# Patient Record
Sex: MALE | Race: WHITE | NOT HISPANIC OR LATINO | Employment: OTHER | URBAN - METROPOLITAN AREA
[De-identification: names, ages, dates, MRNs, and addresses within clinical notes are randomized per-mention and may not be internally consistent; named-entity substitution may affect disease eponyms.]

---

## 2017-09-12 ENCOUNTER — OFFICE VISIT (OUTPATIENT)
Dept: PODIATRY | Facility: CLINIC | Age: 42
End: 2017-09-12
Payer: MEDICARE

## 2017-09-12 VITALS
HEART RATE: 101 BPM | SYSTOLIC BLOOD PRESSURE: 123 MMHG | WEIGHT: 212 LBS | BODY MASS INDEX: 26.36 KG/M2 | DIASTOLIC BLOOD PRESSURE: 85 MMHG | RESPIRATION RATE: 18 BRPM | HEIGHT: 75 IN

## 2017-09-12 DIAGNOSIS — B35.1 ONYCHOMYCOSIS DUE TO DERMATOPHYTE: Primary | ICD-10-CM

## 2017-09-12 PROCEDURE — 3008F BODY MASS INDEX DOCD: CPT | Mod: S$GLB,,, | Performed by: PODIATRIST

## 2017-09-12 PROCEDURE — 99203 OFFICE O/P NEW LOW 30 MIN: CPT | Mod: S$GLB,,, | Performed by: PODIATRIST

## 2017-09-12 RX ORDER — CICLOPIROX 80 MG/ML
SOLUTION TOPICAL NIGHTLY
Qty: 6.6 ML | Refills: 3 | Status: SHIPPED | OUTPATIENT
Start: 2017-09-12 | End: 2017-12-11 | Stop reason: SDUPTHER

## 2017-09-12 RX ORDER — FELBAMATE 400 MG/1
400 TABLET ORAL EVERY 6 HOURS
COMMUNITY

## 2017-09-12 RX ORDER — PHENOBARBITAL 32.4 MG/1
32.4 TABLET ORAL 2 TIMES DAILY
COMMUNITY

## 2017-09-12 RX ORDER — CARBAMAZEPINE 200 MG/1
200 TABLET ORAL 3 TIMES DAILY
COMMUNITY

## 2017-09-12 RX ORDER — RUFINAMIDE 400 MG/1
400 TABLET, FILM COATED ORAL 2 TIMES DAILY WITH MEALS
COMMUNITY

## 2017-09-12 RX ORDER — MULTIVIT WITH MINERALS/HERBS
1 TABLET ORAL DAILY
COMMUNITY

## 2017-09-12 NOTE — PROGRESS NOTES
Subjective:    Patient ID: Martín Astorga is a 42 y.o. male.    Chief Complaint: Nail Problem      HPI:   Martín is a 42 y.o. male who presents to the clinic complaining of thick and discolored toenails on both feet. Martín is inquiring about treatment options.    HPI    Last Podiatry Enc: Visit date not found  Last Enc w/ Me: Visit date not found    No past medical history on file.  No past surgical history on file.  Social History     Social History    Marital status: Single     Spouse name: N/A    Number of children: N/A    Years of education: N/A     Occupational History    Not on file.     Social History Main Topics    Smoking status: Never Smoker    Smokeless tobacco: Not on file    Alcohol use Not on file    Drug use: Unknown    Sexual activity: Not on file     Other Topics Concern    Not on file     Social History Narrative    No narrative on file         Current Outpatient Prescriptions:     b complex vitamins tablet, Take 1 tablet by mouth once daily., Disp: , Rfl:     carbamazepine (TEGRETOL) 200 mg tablet, Take 200 mg by mouth 3 (three) times daily., Disp: , Rfl:     felbamate (FELBATOL) 400 MG Tab, Take 400 mg by mouth every 6 (six) hours., Disp: , Rfl:     phenobarbital (LUMINAL) 32.4 MG tablet, Take 32.4 mg by mouth 2 (two) times daily., Disp: , Rfl:     rufinamide (BANZEL) 400 MG Tab, Take 400 mg by mouth 2 (two) times daily with meals., Disp: , Rfl:     ciclopirox (PENLAC) 8 % Soln, Apply topically nightly., Disp: 6.6 mL, Rfl: 3   Review of patient's allergies indicates:  No Known Allergies    ROS  ROS Constitutional:  General Appearance: well nourished  Vascular: negative for cramps, edema and bruising  Musculoskeletal: negative for joint paint and joint edema  Skin: negative for rashes and lesions  Neurological: negative for burning, tingling and numbness  Gastrointestinal: negative for stomach pain, nausea and vomiting        Objective:        /85 (BP Location: Left arm,  "Patient Position: Sitting)   Pulse 101   Resp 18   Ht 6' 3" (1.905 m)   Wt 96.2 kg (212 lb)   BMI 26.50 kg/m²     Ortho/SPM Exam  Physical Exam  LE exam con't:  V: DP 2/4, PT 2/4, CRT< 3s to all digits tested.     N: SILT in SP/DP/T/Gabbi/Saph distributions.    Derm: Skin intact. No erythema, edema or ecchymosis. nails dystrophic, mycotic    Ortho:  +Motor EHL/FHL/TA/GA   Compartments soft/compressible. No pain on passive stretch of big toe. No calf  pain.        Assessment:     Imaging / Labs:    No results found.          1. Onychomycosis due to dermatophyte - Right Foot          Plan:       Orders Placed This Encounter    ciclopirox (PENLAC) 8 % Soln     Procedures    Martín was seen today for nail problem.    Diagnoses and all orders for this visit:    Onychomycosis due to dermatophyte - Right Foot  -     ciclopirox (PENLAC) 8 % Soln; Apply topically nightly.        Martín Astorga is a 42 y.o. male presenting w/   1. Onychomycosis due to dermatophyte - Right Foot        -pt seen, evaluated, and managed  -dx discussed in detail. All questions/concerns addressed  -all tx options discussed. All alternatives, risks, benefits of all txs discussed  -The patient was educated regarding the above diagnosis.  -rxs dispensed: penlac  -recd otc antifungal foot powder and spray. Use prn  -rec'd [place] board for thick nails  -discussed antifungal footcare at home      Return in about 3 months (around 12/12/2017).      R great toenail fungus  penlac    rtc 3 mo    "

## 2017-09-12 NOTE — PATIENT INSTRUCTIONS
Nail Fungal Infection  A nail fungal infection changes the way fingernails and toenails look. They may thicken, discolor, change shape, or split. This condition is hard to treat because nails grow slowly and have limited blood supply. The infection often comes back after treatment.  There are 2 types of medicines used to treat this condition:  · Topical anti-fungal medicines. These are applied to the surface of the skin and nail area. These medicines are not very effective because they cant get deep into the nail.  · Oral antifungal medicines. These medicines work better because they go into the nail from the inside out. But the infection may still come back. It may take 9 to 12 months for your nail to look normal again. This means you are cured. You can repeat treatment if needed. Most people take these medicines without any problems. It is rare to stop therapy because of side effects. But your healthcare provider may give you some monitoring tests. Talk about possible side effects with your provider before starting treatment.  If medicines fail, the nail can be removed surgically or chemically. These methods physically remove the fungus from the body. This helps medical treatment be more effective.  Home care  · Use medicines exactly as directed for as long as directed. Treating a fungal infection can take longer than other kinds of infections.  · Smoking is a risk factor for fungal infection. This is one more reason to quit.  · Wear absorbent socks, and shoes that let your feet breathe. Sweaty feet increase your risk of fungal infection. They also make an existing infection harder to treat.  · Use footwear when in damp public places like swimming pools, gyms, and shower rooms. This will help you avoid the fungus that grows there.  · Don't share nail clippers or scissors with others.  Follow-up care  Follow up with your healthcare provider, or as advised.  When to seek medical advice  Call your healthcare  provider right away if any of these occur:  · Skin by the nail becomes red, swollen, painful, or drains pus (a creamy yellow or white liquid)  · Side effects from oral anti-fungal medicines  Date Last Reviewed: 8/1/2016  © 3734-6893 [employer]. [address] [place] [address]. All rights reserved. This information is not intended as a substitute for professional medical care. Always follow your healthcare professional's instructions.

## 2017-12-11 ENCOUNTER — OFFICE VISIT (OUTPATIENT)
Dept: PODIATRY | Facility: CLINIC | Age: 42
End: 2017-12-11
Payer: MEDICARE

## 2017-12-11 VITALS
HEIGHT: 75 IN | SYSTOLIC BLOOD PRESSURE: 117 MMHG | RESPIRATION RATE: 18 BRPM | BODY MASS INDEX: 26.61 KG/M2 | HEART RATE: 72 BPM | WEIGHT: 214 LBS | DIASTOLIC BLOOD PRESSURE: 73 MMHG

## 2017-12-11 DIAGNOSIS — B35.1 ONYCHOMYCOSIS DUE TO DERMATOPHYTE: ICD-10-CM

## 2017-12-11 DIAGNOSIS — B35.1 ONYCHOMYCOSIS DUE TO DERMATOPHYTE: Primary | ICD-10-CM

## 2017-12-11 PROCEDURE — 99213 OFFICE O/P EST LOW 20 MIN: CPT | Mod: S$GLB,,, | Performed by: PODIATRIST

## 2017-12-11 RX ORDER — BETAMETHASONE DIPROPIONATE 0.5 MG/G
OINTMENT, AUGMENTED TOPICAL
COMMUNITY
Start: 2017-09-27

## 2017-12-11 RX ORDER — CICLOPIROX 80 MG/ML
SOLUTION TOPICAL NIGHTLY
Qty: 6.6 ML | Refills: 3 | Status: SHIPPED | OUTPATIENT
Start: 2017-12-11

## 2017-12-11 NOTE — PATIENT INSTRUCTIONS
Nail Fungal Infection  A nail fungal infection changes the way fingernails and toenails look. They may thicken, discolor, change shape, or split. This condition is hard to treat because nails grow slowly and have limited blood supply. The infection often comes back after treatment.  There are 2 types of medicines used to treat this condition:  · Topical anti-fungal medicines. These are applied to the surface of the skin and nail area. These medicines are not very effective because they cant get deep into the nail.  · Oral antifungal medicines. These medicines work better because they go into the nail from the inside out. But the infection may still come back. It may take 9 to 12 months for your nail to look normal again. This means you are cured. You can repeat treatment if needed. Most people take these medicines without any problems. It is rare to stop therapy because of side effects. But your healthcare provider may give you some monitoring tests. Talk about possible side effects with your provider before starting treatment.  If medicines fail, the nail can be removed surgically or chemically. These methods physically remove the fungus from the body. This helps medical treatment be more effective.  Home care  · Use medicines exactly as directed for as long as directed. Treating a fungal infection can take longer than other kinds of infections.  · Smoking is a risk factor for fungal infection. This is one more reason to quit.  · Wear absorbent socks, and shoes that let your feet breathe. Sweaty feet increase your risk of fungal infection. They also make an existing infection harder to treat.  · Use footwear when in damp public places like swimming pools, gyms, and shower rooms. This will help you avoid the fungus that grows there.  · Don't share nail clippers or scissors with others.  Follow-up care  Follow up with your healthcare provider, or as advised.  When to seek medical advice  Call your healthcare  provider right away if any of these occur:  · Skin by the nail becomes red, swollen, painful, or drains pus (a creamy yellow or white liquid)  · Side effects from oral anti-fungal medicines  Date Last Reviewed: 8/1/2016  © 6633-9400 [employer]. [address] [place] [id number]. All rights reserved. This information is not intended as a substitute for professional medical care. Always follow your healthcare professional's instructions.

## 2017-12-11 NOTE — PROGRESS NOTES
Subjective:    Patient ID: Martín Astorga is a 42 y.o. male.    Chief Complaint: Nail Problem (fungal nails)      HPI:   Martín is a 42 y.o. male who presents to the clinic complaining of thick and discolored toenails on both feet. Martín is inquiring about treatment options.    12/11/17:  43 yo M f/u for fungal nails. Using penlac. No other complaints.        Last Podiatry Enc: Visit date not found  Last Enc w/ Me: Visit date not found    History reviewed. No pertinent past medical history.  History reviewed. No pertinent surgical history.  Social History     Social History    Marital status: Single     Spouse name: N/A    Number of children: N/A    Years of education: N/A     Occupational History    Not on file.     Social History Main Topics    Smoking status: Never Smoker    Smokeless tobacco: Not on file    Alcohol use Not on file    Drug use: Unknown    Sexual activity: Not on file     Other Topics Concern    Not on file     Social History Narrative    No narrative on file         Current Outpatient Prescriptions:     augmented betamethasone dipropionate (DIPROLENE-AF) 0.05 % ointment, , Disp: , Rfl:     b complex vitamins tablet, Take 1 tablet by mouth once daily., Disp: , Rfl:     carbamazepine (TEGRETOL) 200 mg tablet, Take 200 mg by mouth 3 (three) times daily., Disp: , Rfl:     ciclopirox (PENLAC) 8 % Soln, Apply topically nightly., Disp: 6.6 mL, Rfl: 3    felbamate (FELBATOL) 400 MG Tab, Take 400 mg by mouth every 6 (six) hours., Disp: , Rfl:     phenobarbital (LUMINAL) 32.4 MG tablet, Take 32.4 mg by mouth 2 (two) times daily., Disp: , Rfl:     rufinamide (BANZEL) 400 MG Tab, Take 400 mg by mouth 2 (two) times daily with meals., Disp: , Rfl:    Review of patient's allergies indicates:  No Known Allergies    ROS  ROS Constitutional:  General Appearance: well nourished  Vascular: negative for cramps, edema and bruising  Musculoskeletal: negative for joint paint and joint edema  Skin:  "negative for rashes and lesions  Neurological: negative for burning, tingling and numbness  Gastrointestinal: negative for stomach pain, nausea and vomiting        Objective:        /73 (BP Location: Left arm, Patient Position: Sitting, BP Method: Large (Automatic))   Pulse 72   Resp 18   Ht 6' 3" (1.905 m)   Wt 97.1 kg (214 lb)   BMI 26.75 kg/m²     Ortho/SPM Exam  Physical Exam  LE exam con't:  V: DP 2/4, PT 2/4, CRT< 3s to all digits tested.     N: SILT in SP/DP/T/Gabbi/Saph distributions.    Derm: Skin intact. No erythema, edema or ecchymosis. nails dystrophic, mycotic    Ortho:  +Motor EHL/FHL/TA/GA   Compartments soft/compressible. No pain on passive stretch of big toe. No calf  pain.        Assessment:     Imaging / Labs:    No results found.          1. Onychomycosis due to dermatophyte    2. Onychomycosis due to dermatophyte - Right Foot          Plan:       Orders Placed This Encounter    ciclopirox (PENLAC) 8 % Soln     Procedures    Martín was seen today for nail problem.    Diagnoses and all orders for this visit:    Onychomycosis due to dermatophyte  -     ciclopirox (PENLAC) 8 % Soln; Apply topically nightly.    Onychomycosis due to dermatophyte - Right Foot  -     ciclopirox (PENLAC) 8 % Soln; Apply topically nightly.        Martín Astorga is a 42 y.o. male presenting w/   1. Onychomycosis due to dermatophyte    2. Onychomycosis due to dermatophyte - Right Foot        -pt seen, evaluated, and managed  -dx discussed in detail. All questions/concerns addressed  -all tx options discussed. All alternatives, risks, benefits of all txs discussed  -The patient was educated regarding the above diagnosis.  -rxs dispensed: penlac  -recd otc antifungal foot powder and spray. Use prn  -rec'd [place] board for thick nails  -discussed antifungal footcare at home      Return if symptoms worsen or fail to improve.          "

## 2019-02-25 DIAGNOSIS — G40.814: Primary | ICD-10-CM

## 2021-05-04 ENCOUNTER — PATIENT MESSAGE (OUTPATIENT)
Dept: RESEARCH | Facility: HOSPITAL | Age: 46
End: 2021-05-04